# Patient Record
Sex: MALE | Race: WHITE | NOT HISPANIC OR LATINO | Employment: FULL TIME | URBAN - METROPOLITAN AREA
[De-identification: names, ages, dates, MRNs, and addresses within clinical notes are randomized per-mention and may not be internally consistent; named-entity substitution may affect disease eponyms.]

---

## 2024-02-26 ENCOUNTER — OFFICE VISIT (OUTPATIENT)
Dept: URGENT CARE | Facility: CLINIC | Age: 24
End: 2024-02-26
Payer: COMMERCIAL

## 2024-02-26 VITALS
HEART RATE: 78 BPM | BODY MASS INDEX: 19.39 KG/M2 | WEIGHT: 123.8 LBS | DIASTOLIC BLOOD PRESSURE: 88 MMHG | OXYGEN SATURATION: 97 % | TEMPERATURE: 98 F | SYSTOLIC BLOOD PRESSURE: 130 MMHG

## 2024-02-26 DIAGNOSIS — Z11.3 SCREENING FOR STDS (SEXUALLY TRANSMITTED DISEASES): Primary | ICD-10-CM

## 2024-02-26 DIAGNOSIS — Z20.2 EXPOSURE TO CHLAMYDIA: ICD-10-CM

## 2024-02-26 PROBLEM — K51.00 ULCERATIVE PANCOLITIS (MULTI): Status: ACTIVE | Noted: 2017-03-17

## 2024-02-26 PROBLEM — R59.1 LYMPHADENOPATHY: Status: RESOLVED | Noted: 2024-02-26 | Resolved: 2024-02-26

## 2024-02-26 PROBLEM — K83.01 PSC (PRIMARY SCLEROSING CHOLANGITIS) (CMS-HCC): Status: ACTIVE | Noted: 2018-05-14

## 2024-02-26 PROCEDURE — 87661 TRICHOMONAS VAGINALIS AMPLIF: CPT

## 2024-02-26 PROCEDURE — 99213 OFFICE O/P EST LOW 20 MIN: CPT

## 2024-02-26 PROCEDURE — 87800 DETECT AGNT MULT DNA DIREC: CPT

## 2024-02-26 RX ORDER — DOXYCYCLINE 100 MG/1
100 CAPSULE ORAL 2 TIMES DAILY
Qty: 14 CAPSULE | Refills: 0 | Status: SHIPPED | OUTPATIENT
Start: 2024-02-26 | End: 2024-03-04

## 2024-02-26 RX ORDER — ESCITALOPRAM OXALATE 20 MG/1
20 TABLET ORAL
COMMUNITY
Start: 2020-12-23

## 2024-02-26 RX ORDER — CETIRIZINE HYDROCHLORIDE 10 MG/1
10 TABLET ORAL
COMMUNITY
Start: 2020-12-23

## 2024-02-26 ASSESSMENT — ENCOUNTER SYMPTOMS
HEADACHES: 0
DIAPHORESIS: 0
DIARRHEA: 0
GASTROINTESTINAL NEGATIVE: 1
HEMATURIA: 0
FATIGUE: 0
CHILLS: 0
MYALGIAS: 0
CONSTITUTIONAL NEGATIVE: 1
DIZZINESS: 0
ARTHRALGIAS: 0
NEUROLOGICAL NEGATIVE: 1
PALPITATIONS: 0
RESPIRATORY NEGATIVE: 1
MUSCULOSKELETAL NEGATIVE: 1
ABDOMINAL PAIN: 0
COUGH: 0
DYSURIA: 0
FEVER: 0
CARDIOVASCULAR NEGATIVE: 1
VOMITING: 0
NAUSEA: 0
SHORTNESS OF BREATH: 0

## 2024-02-27 NOTE — PROGRESS NOTES
Subjective     Joaquin Jaramillo is a 23 y.o. male who presents for Exposure to STD.    Patient presents with exposure to chlamydia from an encounter 1 week ago who tested positive. He states that he does not have any symptoms.      History provided by:  Patient and medical records  Exposure to STD  Associated symptoms: no abdominal pain, no chest pain, no cough, no diarrhea, no fatigue, no fever, no headaches, no myalgias, no nausea, no rash, no shortness of breath and no vomiting        /88   Pulse 78   Temp 36.7 °C (98 °F)   Wt 56.2 kg (123 lb 12.8 oz)   SpO2 97%   BMI 19.39 kg/m²    All vitals have been reviewed and are stable.    Review of Systems   Constitutional: Negative.  Negative for chills, diaphoresis, fatigue and fever.   Respiratory: Negative.  Negative for cough and shortness of breath.    Cardiovascular: Negative.  Negative for chest pain and palpitations.   Gastrointestinal: Negative.  Negative for abdominal pain, diarrhea, nausea and vomiting.   Genitourinary:  Negative for decreased urine volume, dysuria, genital sores, hematuria, penile discharge, penile pain, scrotal swelling, testicular pain and urgency.   Musculoskeletal: Negative.  Negative for arthralgias and myalgias.   Skin: Negative.  Negative for rash.   Neurological: Negative.  Negative for dizziness and headaches.       Objective   Physical Exam  Vitals and nursing note reviewed.   Constitutional:       General: He is awake. He is not in acute distress.     Appearance: Normal appearance. He is well-developed.   HENT:      Head: Normocephalic and atraumatic.      Nose: Nose normal.      Mouth/Throat:      Lips: Pink.      Mouth: Mucous membranes are moist.      Pharynx: Oropharynx is clear.   Eyes:      Extraocular Movements: Extraocular movements intact.      Conjunctiva/sclera: Conjunctivae normal.      Pupils: Pupils are equal, round, and reactive to light.   Cardiovascular:      Rate and Rhythm: Normal rate and regular  rhythm.   Pulmonary:      Effort: Pulmonary effort is normal. No respiratory distress.   Abdominal:      General: Abdomen is flat. There is no distension.   Musculoskeletal:         General: No swelling or deformity. Normal range of motion.      Cervical back: Normal range of motion.   Skin:     General: Skin is warm and dry.   Neurological:      General: No focal deficit present.      Mental Status: He is alert and oriented to person, place, and time. Mental status is at baseline.      Motor: Motor function is intact.      Coordination: Coordination is intact.   Psychiatric:         Mood and Affect: Mood and affect normal.         Speech: Speech normal.         Behavior: Behavior normal.         Thought Content: Thought content normal.         Judgment: Judgment normal.         Assessment/Plan   Problem List Items Addressed This Visit    None  Visit Diagnoses       Screening for STDs (sexually transmitted diseases)    -  Primary    Relevant Medications    doxycycline (Monodox) 100 mg capsule    Other Relevant Orders    Trichomonas vaginalis, Nucleic Acid Detection    C. Trachomatis / N. Gonorrhoeae, Amplified Detection    Exposure to chlamydia        Relevant Medications    doxycycline (Monodox) 100 mg capsule            Red flags for reporting to ER have been reviewed with the patient.    Current diagnosis, any medication changes, and all in-office lab or radiologic results have been reviewed with the patient at the time of the visit.   If symptoms do not improve or worsen, patient is to follow up with PCP or report to the emergency room.   Patient is alert and oriented x3 and non-toxic appearing. Vital signs are stable.   Patient and/or guardian has sufficient decision-making capabilities at this time and reports understanding and agreement with the treatment plan made through shared decision-making.

## 2024-02-28 LAB
C TRACH RRNA SPEC QL NAA+PROBE: POSITIVE
N GONORRHOEA DNA SPEC QL PROBE+SIG AMP: NEGATIVE
T VAGINALIS RRNA SPEC QL NAA+PROBE: NEGATIVE